# Patient Record
Sex: FEMALE | Race: WHITE | URBAN - METROPOLITAN AREA
[De-identification: names, ages, dates, MRNs, and addresses within clinical notes are randomized per-mention and may not be internally consistent; named-entity substitution may affect disease eponyms.]

---

## 2019-02-21 ENCOUNTER — OFFICE VISIT (OUTPATIENT)
Dept: URBAN - METROPOLITAN AREA CLINIC 45 | Facility: CLINIC | Age: 69
End: 2019-02-21
Payer: COMMERCIAL

## 2019-02-21 DIAGNOSIS — H53.2 DIPLOPIA: Primary | ICD-10-CM

## 2019-02-21 DIAGNOSIS — H43.813 VITREOUS DEGENERATION, BILATERAL: ICD-10-CM

## 2019-02-21 PROCEDURE — 92004 COMPRE OPH EXAM NEW PT 1/>: CPT | Performed by: OPHTHALMOLOGY

## 2019-02-21 ASSESSMENT — INTRAOCULAR PRESSURE
OS: 14
OD: 15

## 2019-02-21 ASSESSMENT — KERATOMETRY
OD: 44.00
OS: 44.00

## 2019-02-21 NOTE — IMPRESSION/PLAN
Impression: Diplopia: H53.2. 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 No treatment is required at this time. Reassured patient of current condition and treatment. Will continue to observe condition and or symptoms.